# Patient Record
Sex: FEMALE | Race: WHITE | URBAN - METROPOLITAN AREA
[De-identification: names, ages, dates, MRNs, and addresses within clinical notes are randomized per-mention and may not be internally consistent; named-entity substitution may affect disease eponyms.]

---

## 2019-10-15 LAB
ABS NEUT (OLG): 5.88 X10(3)/MCL (ref 2.1–9.2)
BASOPHILS # BLD AUTO: 0.1 X10(3)/MCL (ref 0–0.2)
BASOPHILS NFR BLD AUTO: 1 %
EOSINOPHIL # BLD AUTO: 0.2 X10(3)/MCL (ref 0–0.9)
EOSINOPHIL NFR BLD AUTO: 2 %
ERYTHROCYTE [DISTWIDTH] IN BLOOD BY AUTOMATED COUNT: 12.1 % (ref 11.5–17)
HCT VFR BLD AUTO: 40.8 % (ref 37–47)
HGB BLD-MCNC: 13.1 GM/DL (ref 12–16)
LYMPHOCYTES # BLD AUTO: 2.8 X10(3)/MCL (ref 0.6–4.6)
LYMPHOCYTES NFR BLD AUTO: 29 %
MCH RBC QN AUTO: 31 PG (ref 27–31)
MCHC RBC AUTO-ENTMCNC: 32.1 GM/DL (ref 33–36)
MCV RBC AUTO: 96.5 FL (ref 80–94)
MONOCYTES # BLD AUTO: 0.7 X10(3)/MCL (ref 0.1–1.3)
MONOCYTES NFR BLD AUTO: 8 %
NEUTROPHILS # BLD AUTO: 5.88 X10(3)/MCL (ref 2.1–9.2)
NEUTROPHILS NFR BLD AUTO: 61 %
PLATELET # BLD AUTO: 254 X10(3)/MCL (ref 130–400)
PMV BLD AUTO: 11.1 FL (ref 9.4–12.4)
RBC # BLD AUTO: 4.23 X10(6)/MCL (ref 4.2–5.4)
WBC # SPEC AUTO: 9.6 X10(3)/MCL (ref 4.5–11.5)

## 2019-10-18 ENCOUNTER — HISTORICAL (OUTPATIENT)
Dept: SURGERY | Facility: HOSPITAL | Age: 34
End: 2019-10-18

## 2022-04-30 NOTE — OP NOTE
Patient:   Carmen Ronquillo            MRN: [id number]            FIN: [id number]               Age:   34 years     Sex:  Female     :  1985   Associated Diagnoses:   Soft tissue mass; Endometrioma   Author:   Hammad Herron MD      Operative Note   Operative Information   Date/ Time:  10/18/2019 10:39:00.     Procedures Performed: excision of soft tissue mass, excision of scar, skin, fascia, tumor.     Preoperative Diagnosis: Soft tissue mass ([id number]-BN M79.9), Endometrioma (ETD69-SX N80.9).     Postoperative Diagnosis: Soft tissue mass (SAY59-LL M79.9), Endometrioma (WGE78-QH N80.9).     Surgeon: Hammad Herron MD.     Anesthesia: Local, General.     Speciman Removed: soft tissue mass.     Description of Procedure/Findings/    Complications: Area prepped and draped.  Local infused.  Incision performed to encompass mass.  Complete excision performed.  Wound closed primarily.  .     Esimated blood loss: No blood loss.     Complications: None.